# Patient Record
(demographics unavailable — no encounter records)

---

## 2025-04-11 NOTE — REVIEW OF SYSTEMS
[Negative] : Neurological [Seeing Double (Diplopia)] : no diplopia [Lower Ext Edema] : no extremity edema [Leg Claudication] : no intermittent leg claudication [Confusion] : no confusion was observed [Anxiety] : no anxiety [FreeTextEntry5] : see HPI

## 2025-04-11 NOTE — HISTORY OF PRESENT ILLNESS
[FreeTextEntry1] : This is a 60 year old woman hx HTN, HLD referred to cardiology eval after developing chest pain. She reports this occurred about 2 months ago. She mostly notes its worse in the morning lasting about 15 minutes but comes on and off throughout the day, described as heaviness with dyspnea and pins and needles down the left arm. She denies a positional trigger, reports it is triggered if she exerts herself. Also notes palpitations and occasional chest burning when she climbs stairs, relieved by rest.   ==== data reviewed today=== EKG NSR VS Labs 11/2024 TSH 0.94  HDL 53  Cr 0.72 Father MI in 75

## 2025-04-11 NOTE — ASSESSMENT
[FreeTextEntry1] :  This is a 60 year old with PMH HTN, HLD here today for eval of chest pain, dyspnea and plapitations.   Assessment # Chest pain  # Dyspnea on exertion  #palpitaitons  #HLD uncontrolled # family hx of CAD.   Plan: Low-fat, low-salt diet discussed. Repeat lipid/ tsh/ a1c/ cbc/cmp for risk stratification. Start Atorvastatin 10 mg QHS. Continue Metoprolol for now. Patient will start keeping BP log at home and checking it twice daily. Exercise stress echocardiogram.  MCOT x 2 weeks for palpitations. F/u after the tests.  Cuco Pichardo MD